# Patient Record
Sex: MALE | Race: WHITE | NOT HISPANIC OR LATINO | URBAN - METROPOLITAN AREA
[De-identification: names, ages, dates, MRNs, and addresses within clinical notes are randomized per-mention and may not be internally consistent; named-entity substitution may affect disease eponyms.]

---
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
## 2022-03-03 ENCOUNTER — OFFICE (OUTPATIENT)
Dept: URBAN - METROPOLITAN AREA CLINIC 72 | Facility: CLINIC | Age: 22
End: 2022-03-03

## 2022-03-03 VITALS
OXYGEN SATURATION: 99 % | HEART RATE: 68 BPM | HEIGHT: 73 IN | SYSTOLIC BLOOD PRESSURE: 116 MMHG | WEIGHT: 189 LBS | DIASTOLIC BLOOD PRESSURE: 64 MMHG

## 2022-03-03 DIAGNOSIS — Z79.899 OTHER LONG TERM (CURRENT) DRUG THERAPY: ICD-10-CM

## 2022-03-03 DIAGNOSIS — E55.9 VITAMIN D DEFICIENCY, UNSPECIFIED: ICD-10-CM

## 2022-03-03 DIAGNOSIS — K50.811 CROHN'S DISEASE OF BOTH SMALL AND LARGE INTESTINE WITH RECTA: ICD-10-CM

## 2022-03-03 DIAGNOSIS — R63.4 ABNORMAL WEIGHT LOSS: ICD-10-CM

## 2022-03-03 PROCEDURE — 99214 OFFICE O/P EST MOD 30 MIN: CPT | Performed by: INTERNAL MEDICINE

## 2022-03-03 RX ORDER — BUDESONIDE 3 MG/1
CAPSULE ORAL
Qty: 132 | Refills: 0 | Status: COMPLETED
Start: 2022-03-03 | End: 2022-06-21

## 2022-03-03 NOTE — SERVICENOTES
Our goal is to partner with you to improve your health and well being. It is important for you to complete necessary testing and follow the instructions given to you at your clinic visit. Our office will call you within 2 weeks with results of any testing but you may also call sooner to obtain results [phone].   If you have any questions or concerns please feel free to call.  We take your care very seriously and we thank you for your trust!
- I would like to start entyvio at our [facility].  You ashley need it at week 0,2,6 and then every 8 weeks
- I would like you to get stool test for inflammation (fecal calprotectin) as a baseline.  Please submit this prior to starting budesonide.
- take 3 pills a day (9 mg) for 1 month, then 2 pills a day (6 mg) for 2 weeks then 1 pill a day (3 mg) for 2 weeks then stop. If you have any side effects then please let me know. 
- please make an appointment to see me after your 3rd dose (after you complete 6 week induction) which should be in about 2-3 months
- please let me know if symtpoms are getting worse or you have any other new or concerning symtpoms.

## 2022-03-03 NOTE — SERVICEHPINOTES
Ramy Irizarry   is seen today for a follow-up visit.  
br
christopher
Andree has a history of crohn's disease in the small bowel and colon. Andree just moved here. He just moved in to his dorm yesterday at [facility]. He is from [place]. He is studying [employer].He is planning this summer to go abroad for 3 months.Diagnosed at age 13 and since then has been on stellara, imuran, humira and now remicade.He started humira because he started having symptoms again and he hated the injections.He started remicade around 2018. He feels like remicade works OK> if he gets stressed he will has symptoms. If he gets stressed he will have diarrhea, loss of appetite, weight loss.In the last 1-2 months he has been feeling pretty good. He has about 3 BM a day, without pain. He has been noticing some blood in the stool but it comes and goes. The blood is mixed in with the stool. He has some cramping and abdoinal pain if he eats bad or is stressed. No joint pain or rashes.Interval History:12/2/2021br8/2021 labs c/w ginny, vitamin D deficiency (mild)br8/2021 Campyolobacter positive and FC 120brI let him know we could treat him with azithromycin but he did not want treatment at this time. Pasha recommended he start vitamin D 1000 iu a day?br11/2021 noted glucose to be very low (43) and liver tests are abnormal (raised slightly in october and now more)andree has gained weightbrHe is loving [place] [employer] and school. brCrohn's symptoms are doing OK but during times of stress. brWhen it acts up it is diarrhea and cramping. No blood in the stool. andree can have some sever cramps but it goes away after BM. He is doing well on Remicade every 5 weeks. He is not sure if he is going abroad this summer or next summer. He denies any hypoglycemia symptoms. No sweating, no shakes, no light headed feeling ect. Overall doing well.    br  Plan from last visit:
br
br- start daily a daily multivitaminbr- start vitamin D 1000 iu a daybr- repeat the stool for inflammationbr- continue remicade for nowbr- if you are going to go out of the country for the summer, please let us knowbr-, start hyoscyamine/levsin 1 pill sublingual up to every 8 hours as needed for abdominal cramping or pain. let us know if that is working or notbr- follow up in 6 months or sooner if neededbr- eat frequent snacks with fiber and protein to keep glucose up. Interval History:  3/3/2022   
br  He feels like his symptoms are worse.  
br He has stomach pain and diarrhea.  
br He doesn't feel like it gets better after the infusion. 
br He generally has a BM 2-4 times a day .  He is not waking up in the middle of the night to have a BM. 
brHe is taking MVI, b12, iron. 
br He never felt like he got into a good remission with remicade.  My nurse has reviewed and updated the medication list with the patient (medication reconciliation). I have also reviewed the medication list. New updates were made to the patient's medical, social and family history. Pertinent details are also noted above in the HPI.br visited="true"

## 2022-06-21 ENCOUNTER — OFFICE (OUTPATIENT)
Dept: URBAN - METROPOLITAN AREA CLINIC 84 | Facility: CLINIC | Age: 22
End: 2022-06-21

## 2022-06-21 VITALS — HEIGHT: 73 IN | BODY MASS INDEX: 25.98 KG/M2 | WEIGHT: 196 LBS

## 2022-06-21 DIAGNOSIS — E80.4 GILBERT SYNDROME: ICD-10-CM

## 2022-06-21 DIAGNOSIS — K50.811 CROHN'S DISEASE OF BOTH SMALL AND LARGE INTESTINE WITH RECTA: ICD-10-CM

## 2022-06-21 PROCEDURE — 99214 OFFICE O/P EST MOD 30 MIN: CPT | Performed by: NURSE PRACTITIONER

## 2022-06-21 RX ORDER — BUDESONIDE 3 MG/1
CAPSULE ORAL
Qty: 132 | Refills: 0 | Status: COMPLETED
Start: 2022-06-21 | End: 2022-08-18

## 2022-06-21 NOTE — SERVICENOTES
Location of patient: At work, but in the break room
Location of provider: [place] location
Other persons participating: None, pt is alone

## 2022-06-21 NOTE — SERVICEHPINOTES
Ramy Irizarry is seen today for a follow-up visit. Andree has a history of crohn's disease in the small bowel and colon.Andree just moved here. He just moved in to his dorm yesterday at [facility]. He is from [place]. He is studying [employer].Andree is planning this summer to go abroad for 3 months.christopherDiagnosed at age 13 and since then has been on stellara, imuran, humira and now remicade.Andree started humira because he started having symptoms again and he hated the injections.Andree started remicade around 2018. He feels like remicade works OK> if he gets stressed he will has symptoms. If he gets stressed he will have diarrhea, loss of appetite, weight loss.brIn the last 1-2 months he has been feeling pretty good. He has about 3 BM a day, without pain. He has been noticing some blood in the stool but it comes and goes. The blood is mixed in with the stool. He has some cramping and abdoinal pain if he eats bad or is stressed. No joint pain or rashes.Interval History:12/2/2021br8/2021 labs c/w ginny, vitamin D deficiency (mild)br8/2021 Campyolobacter positive and FC 120brI let him know we could treat him with azithromycin but he did not want treatment at this time.Pasha recommended he start vitamin D 1000 iu a day?br11/2021 noted glucose to be very low (43) and liver tests are abnormal (raised slightly in october and now more)andree has gained weightbrKiran is loving [employer] and school.brCrohn's symptoms are doing OK but during times of stress.brWhen it acts up it is diarrhea and cramping. No blood in the stool.andree can have some sever cramps but it goes away after BM.Andree is doing well on Remicade every 5 weeks.Andree is not sure if he is going abroad this summer or next summer.Andree denies any hypoglycemia symptoms. No sweating, no shakes, no light headed feeling ect. Overall doing well.brPlan from last visit:- start daily a daily multivitaminbr- start vitamin D 1000 iu a daybr- repeat the stool for inflammationbr- continue remicade for nowbr- if you are going to go out of the country for the summer, please let us knowbr-, start hyoscyamine/levsin 1 pill sublingual up to every 8 hours as needed for abdominal cramping or pain. let us know if that is working or notbr- follow up in 6 months or sooner if neededbr- eat frequent snacks with fiber and protein to keep glucose up.Interval History:3/3/2022bChivo feels like his symptoms are worse. Andree has stomach pain and diarrhea. Andree doesn't feel like it gets better after the infusion. Andree generally has a BM 2-4 times a day . He is not waking up in the middle of the night to have a BM. Andree is taking MVI, b12, iron. Andree never felt like he got into a good remission with remicade.
br Plan
br- I would like to start entyvio at our [facility] center. You ashley need it at week 0,2,6 and then every 8 weeksbr- I would like you to get stool test for inflammation (fecal calprotectin) as a baseline. Please submit this prior to starting budesonide.br- take 3 pills a day (9 mg) for 1 month, then 2 pills a day (6 mg) for 2 weeks then 1 pill a day (3 mg) for 2 weeks then stop. If you have any side effects then please let me know. br- please make an appointment to see me after your 3rd dose (after you complete 6 week induction) which should be in about 2-3 monthsbr- please let me know if symtpoms are getting worse or you have any other new or concerning symtpoms.
br
br
br Interval history, 6/21/2022
br he did not start the budesonide.  His insurance denied Entyvio.  He did not qualify for patient assistance for Entyvio.  His last Remicade dose was about 5 months ago.  His symptoms worsened about 3 weeks ago.  He states this is the worst it's been in a long time.  He is having 5-6 bowel movements a day that are not nocturnal.  He is medium amount of blood and mucus about one bowel movement a day.  He does not have rectal pain.  He does have lower abdominal cramping with a bowel movement.  He denies weight loss, nausea vomiting.  He reports some pain in the groin on the right side makes it difficult to sleep and pain in his right ankle.  He denies rashbrStudies:brLabs:br1/22 CMP normal, glucose 109, CBC normal br11/21 K 5.3, glucose 41, ast 48, alt 69, t bili 1.7, alk phos 100, ggt 19br10/2021 t bili 2.2, ast 22, alt 37, glucose 72, cbc normal, esr 3, normal crpbr8/2021 Campylobacter positive, FC 120br8/2021 normal cbc, glucose 62, bilirubin 2.9, other LFT normal, direct bilirubin normal and indirect 2.75, Q gold neg, folate normal, Hep B Ab neg, vit D 24, esr/crp normal, b12 821, ferritin 99br7/2021 creat 0.83, t bili 1.5, ast 24, ALT 21, alk phos 94imaging:br3/21 MRI abd/pelvis with small perianal fistula, no abscess. mild idlation of second portion of the dudenum, in creased enhancement of the TI, increased enhanement of the sigmoid.procedures:brColonoscopy:br4/2021 colonoscopy fissure on perianal exam. NOrmla colon. mildly erythematous and friable distal ileum biopsies.br9/2016 colonoscopy apthous ulcrs in the ileum, normal cecum and ac,apthous ulcers in the transverse colon. Apthous ulcers in the left colon, large ulcers in the rectum. Total CD score 16.brSurgeries:brEGD:4/2021 EGD normal.br9/2016 extopic pancreatic tissues at the pylorus ow normal EGDbrother: visited="true"

## 2022-08-18 ENCOUNTER — OFFICE (OUTPATIENT)
Dept: URBAN - METROPOLITAN AREA CLINIC 72 | Facility: CLINIC | Age: 22
End: 2022-08-18

## 2022-08-18 ENCOUNTER — OFFICE (OUTPATIENT)
Dept: URBAN - METROPOLITAN AREA CLINIC 67 | Facility: CLINIC | Age: 22
End: 2022-08-18
Payer: COMMERCIAL

## 2022-08-18 VITALS
SYSTOLIC BLOOD PRESSURE: 117 MMHG | DIASTOLIC BLOOD PRESSURE: 80 MMHG | HEART RATE: 64 BPM | TEMPERATURE: 98.4 F | SYSTOLIC BLOOD PRESSURE: 111 MMHG | HEIGHT: 73 IN | HEART RATE: 64 BPM | HEART RATE: 64 BPM | HEART RATE: 81 BPM | SYSTOLIC BLOOD PRESSURE: 121 MMHG | DIASTOLIC BLOOD PRESSURE: 80 MMHG | HEIGHT: 73 IN | WEIGHT: 189 LBS | DIASTOLIC BLOOD PRESSURE: 76 MMHG | DIASTOLIC BLOOD PRESSURE: 76 MMHG | WEIGHT: 189 LBS | SYSTOLIC BLOOD PRESSURE: 111 MMHG | HEART RATE: 81 BPM | OXYGEN SATURATION: 94 % | WEIGHT: 189 LBS | RESPIRATION RATE: 14 BRPM | DIASTOLIC BLOOD PRESSURE: 76 MMHG | TEMPERATURE: 98.4 F | HEART RATE: 81 BPM | RESPIRATION RATE: 14 BRPM | DIASTOLIC BLOOD PRESSURE: 80 MMHG | SYSTOLIC BLOOD PRESSURE: 117 MMHG | DIASTOLIC BLOOD PRESSURE: 73 MMHG | RESPIRATION RATE: 14 BRPM | DIASTOLIC BLOOD PRESSURE: 73 MMHG | SYSTOLIC BLOOD PRESSURE: 121 MMHG | OXYGEN SATURATION: 94 % | HEART RATE: 84 BPM | TEMPERATURE: 98.4 F | OXYGEN SATURATION: 97 % | DIASTOLIC BLOOD PRESSURE: 73 MMHG | SYSTOLIC BLOOD PRESSURE: 117 MMHG | SYSTOLIC BLOOD PRESSURE: 121 MMHG | OXYGEN SATURATION: 97 % | HEART RATE: 84 BPM | HEIGHT: 73 IN | SYSTOLIC BLOOD PRESSURE: 111 MMHG | OXYGEN SATURATION: 94 % | OXYGEN SATURATION: 97 % | HEART RATE: 84 BPM

## 2022-08-18 VITALS
OXYGEN SATURATION: 99 % | BODY MASS INDEX: 25.45 KG/M2 | WEIGHT: 192 LBS | SYSTOLIC BLOOD PRESSURE: 120 MMHG | HEART RATE: 78 BPM | HEIGHT: 73 IN | DIASTOLIC BLOOD PRESSURE: 80 MMHG

## 2022-08-18 DIAGNOSIS — R19.7 DIARRHEA, UNSPECIFIED: ICD-10-CM

## 2022-08-18 DIAGNOSIS — K50.90 CROHN'S DISEASE, UNSPECIFIED, WITHOUT COMPLICATIONS: ICD-10-CM

## 2022-08-18 DIAGNOSIS — K92.1 MELENA: ICD-10-CM

## 2022-08-18 DIAGNOSIS — E80.4 GILBERT SYNDROME: ICD-10-CM

## 2022-08-18 DIAGNOSIS — K50.811 CROHN'S DISEASE OF BOTH SMALL AND LARGE INTESTINE WITH RECTA: ICD-10-CM

## 2022-08-18 PROCEDURE — 96413 CHEMO IV INFUSION 1 HR: CPT

## 2022-08-18 PROCEDURE — 99214 OFFICE O/P EST MOD 30 MIN: CPT | Performed by: NURSE PRACTITIONER

## 2022-08-18 NOTE — SERVICEHPINOTES
See follow-up visit from:   8/18/2022   
br   Date &amp Results of last TB test:   7/29/2022     Negative   
br   Labs and/or Additional Orders:  CMPbr Insurance authorization:  SAMPLED THROUGH [employer] PROGRAMbr Pharmacy:   [facility]   
br   Rate of Infusion:  1 hour  brInfusion order placed on:  8/15/22   
br 
Time out performed with:  Eileen Rodriguez RN

## 2022-08-18 NOTE — SERVICEHPINOTES
See follow-up visit from:   8/18/2022   
br   Date &amp Results of last TB test:   7/29/2022     Negative   
br   Labs and/or Additional Orders:  CMPbr Insurance authorization:  SAMPLED THROUGH [employer] PROGRAMbr Pharmacy:   [facility]   
br   Rate of Infusion:  1 hour  brInfusion order placed on:  8/15/22   
br 
Time out performed with:  Eileen Rodriguez RN

## 2022-08-18 NOTE — SERVICEHPINOTES
See follow-up visit from:   8/18/2022   
br   Date &amp Results of last TB test:   7/29/2022     Negative   
br   Labs and/or Additional Orders:  CMPbr Insurance authorization:  SAMPLED THROUGH [employer] PROGRAMbr Pharmacy:   [facility]   
br   Rate of Infusion:  1 hour  brInfusion order placed on:  8/15/22   
br 
Time out performed with:  Eileen Rodriguez RN

## 2022-09-19 ENCOUNTER — OFFICE (OUTPATIENT)
Dept: URBAN - METROPOLITAN AREA CLINIC 67 | Facility: CLINIC | Age: 22
End: 2022-09-19
Payer: COMMERCIAL

## 2022-09-19 VITALS
SYSTOLIC BLOOD PRESSURE: 128 MMHG | TEMPERATURE: 97.7 F | OXYGEN SATURATION: 98 % | HEIGHT: 73 IN | TEMPERATURE: 97.9 F | DIASTOLIC BLOOD PRESSURE: 68 MMHG | RESPIRATION RATE: 14 BRPM | DIASTOLIC BLOOD PRESSURE: 87 MMHG | HEART RATE: 66 BPM | HEART RATE: 59 BPM | WEIGHT: 201 LBS | DIASTOLIC BLOOD PRESSURE: 83 MMHG | OXYGEN SATURATION: 99 % | SYSTOLIC BLOOD PRESSURE: 112 MMHG | HEART RATE: 69 BPM

## 2022-09-19 DIAGNOSIS — Z23 ENCOUNTER FOR IMMUNIZATION: ICD-10-CM

## 2022-09-19 DIAGNOSIS — K50.90 CROHN'S DISEASE, UNSPECIFIED, WITHOUT COMPLICATIONS: ICD-10-CM

## 2022-09-19 PROCEDURE — 90471 IMMUNIZATION ADMIN: CPT | Performed by: NURSE PRACTITIONER

## 2022-09-19 PROCEDURE — 96413 CHEMO IV INFUSION 1 HR: CPT | Performed by: NURSE PRACTITIONER

## 2022-09-19 NOTE — SERVICENOTES
Patient observed for 10 minutes post infusion.  Patient denies chest pain, shortness of breath, or dizziness.  Handout given and reviewed with patient.  Instructed on common side effects.  Patient has no questions.

## 2022-09-19 NOTE — SERVICEHPINOTES
See follow-up visit from:   8/18/2022   
br   Date &amp Results of last TB test:   7/29/2022     Negative   
br   Labs and/or Additional Orders: CBC/CMPbr Insurance authorization: no PA sampled through [facility]br Pharmacy:   [facility]   
br   Rate of Infusion:  1 hour  brInfusion order placed on:  8/15/2022   
br 
Time out performed with:  George Martinez
br

## 2022-09-23 ENCOUNTER — OFFICE (OUTPATIENT)
Dept: URBAN - METROPOLITAN AREA CLINIC 72 | Facility: CLINIC | Age: 22
End: 2022-09-23

## 2022-09-23 VITALS
SYSTOLIC BLOOD PRESSURE: 138 MMHG | HEART RATE: 98 BPM | WEIGHT: 200 LBS | HEIGHT: 73 IN | DIASTOLIC BLOOD PRESSURE: 82 MMHG | OXYGEN SATURATION: 97 % | BODY MASS INDEX: 26.51 KG/M2

## 2022-09-23 DIAGNOSIS — K50.811 CROHN'S DISEASE OF BOTH SMALL AND LARGE INTESTINE WITH RECTA: ICD-10-CM

## 2022-09-23 DIAGNOSIS — M19.90 UNSPECIFIED OSTEOARTHRITIS, UNSPECIFIED SITE: ICD-10-CM

## 2022-09-23 DIAGNOSIS — M25.50 PAIN IN UNSPECIFIED JOINT: ICD-10-CM

## 2022-09-23 DIAGNOSIS — M79.89 OTHER SPECIFIED SOFT TISSUE DISORDERS: ICD-10-CM

## 2022-09-23 PROCEDURE — 99214 OFFICE O/P EST MOD 30 MIN: CPT | Performed by: INTERNAL MEDICINE

## 2022-09-23 RX ORDER — PREDNISONE 10 MG/1
TABLET ORAL
Qty: 60 | Refills: 0 | Status: ACTIVE
Start: 2022-09-23

## 2022-09-23 RX ORDER — PREDNISONE 5 MG/1
TABLET, DELAYED RELEASE ORAL
Qty: 60 | Refills: 0 | Status: ACTIVE
Start: 2022-09-23

## 2022-09-23 NOTE — SERVICENOTES
Our goal is to partner with you to improve your health and well being. It is important for you to complete necessary testing and follow the instructions given to you at your clinic visit. Our office will call you within 2 weeks with results of any testing but you may also call sooner to obtain results [phone].   If you have any questions or concerns please feel free to call.  We take your care very seriously and we thank you for your trust!
- take vitamin D, fish oil and curcumin.  Magnesium may also help your leg pain but avoid magnesium oxide or magnesium citrate.  Try to get slow mag or magnesium gluconate.  
- US of right leg to look for blood clot
- please turn in stool studies that tyson ordered in August which helps us get a basliine to follow with treatment
- continue prednisone 10 mg till your next infusion then drop to 5 mg for 1-2 weeks and then 2.5 mg for 1-2 weeks then stop

## 2022-09-23 NOTE — SERVICEHPINOTES
Ramy Irizarry   is seen today for a follow-up visit.  
christopher Phillips has a history of crohn's disease in the small bowel and colon.Andree just moved here. He just moved in to his dorm yesterday at [employer]. He is from [place]. He is studying [employer].Andree is planning this summer to go abroad for 3 months.christopherDiagnosed at age 13 and since then has been on stellara, imuran, humira and now remicade.Andree started humira because he started having symptoms again and he hated the injections.Andree started remicade around 2018. He feels like remicade works OK> if he gets stressed he will has symptoms. If he gets stressed he will have diarrhea, loss of appetite, weight loss.brIn the last 1-2 months he has been feeling pretty good. He has about 3 BM a day, without pain. He has been noticing some blood in the stool but it comes and goes. The blood is mixed in with the stool. He has some cramping and abdoinal pain if he eats bad or is stressed. No joint pain or rashes.Interval History:12/2/2021br8/2021 labs c/w ginny, vitamin D deficiency (mild)br8/2021 Campyolobacter positive and FC 120brI let him know we could treat him with azithromycin but he did not want treatment at this time.Pasha recommended he start vitamin D 1000 iu a day?br11/2021 noted glucose to be very low (43) and liver tests are abnormal (raised slightly in october and now more)andree has gained weightbrHe is loving [place] and school.brCrohn's symptoms are doing OK but during times of stress.brWhen it acts up it is diarrhea and cramping. No blood in the stool.andree can have some sever cramps but it goes away after BM.Andree is doing well on Remicade every 5 weeks.Andree is not sure if he is going abroad this summer or next summer.Andree denies any hypoglycemia symptoms. No sweating, no shakes, no light headed feeling ect. Overall doing well.brInterval History:3/3/2022bChivo feels like his symptoms are worse.Andree has stomach pain and diarrhea.Andree doesn't feel like it gets better after the infusion.Andree generally has a BM 2-4 times a day. He is not waking up in the middle of the night to have a BM.Andree is taking MVI, b12, iron.Andree never felt like he got into a good remission with remicade.brInterval history, 6/21/2022brhfidencio did not start the budesonide. His insurance denied Entyvio. He did not qualify for patient assistance for Entyvio. His last Remicade dose was about 5 months ago. His symptoms worsened about 3 weeks ago. He states this is the worst it's been in a long time. He is having 5-6 bowel movements a day that are not nocturnal. He is medium amount of blood and mucus about one bowel movement a day. He does not have rectal pain. He does have lower abdominal cramping with a bowel movement. He denies weight loss, nausea vomiting. He reports some pain in the groin on the right side makes it difficult to sleep and pain in his right ankle. He denies rashthe lastbrInterval history, 8/18/2022brhfidencio did not return his stool study. Budesonide did not help. He was placed on prednisone 40 mg 7/7/2022. He developed worsening arthralgias and right foot, knee, groin and back pain. Normal with the rheumatologist who thought it was related to his Crohn's. He gisela labs which showed elevated inflammatory markers but negative TB, hepatitis B, CMP. His WBC was slightly elevated. gave him a shot of Kenalog.brSince he could not get approved for into the area, we decided to treat with Skyrizi. His insurance denied it. He was approved for compassionate assistance through the drug company. He is receiving his first infusion today.Andree states he's having up to 6 bowel movements a day with occasional nocturnal stool. He sees blood with 2-3 bowel movements a day. He continues to have lower abdominal pain moderate cramping. He is currently on prednisone 30 mg daily. He is going back to college at [employer] tomorrow.    br  Plan from last visit:
br
brStart Skyrizi 600 mg IV today week 0 repeat week 4 and week 8. He will switch to 360 mg subcutaneous at week 12 and then every 8 weeks. He's been advised of the potential side effects including risk of infection, TB reactivation, hepatotoxicity's. He's been advised to avoid live vaccines. He's been advised also of other potential side effects including upper respiratory infection headache, arthralgias, abdominal pain anemia back pain and UTI. Monitor LFTs. I will review with Dr. Miramontes regarding the prednisone taper. He will return stool to assess for infection and calpro. Interval History:   
br   He did not return stool testsbr Skyrizi started and he got a dose on 8/18 and 9/19
br needs another infusion week 8, start injections at week 12 then every 8 week sub Q injections
br labs after one month look great, normal LFT
br
br HE is on 10 mg of prednisone , he feels like he still needs the prednisone.  
brIf he goes off it then his arthritis really starts acting up.  He has a lot of right leg pain and back pain.  
br He has some sweliing in his right knee.  
br His abdominal pain and symptoms are better.  He is still having diarrhea.  He has a BM 3-5 times a day.  Occasional blood in the stool.  He has cramping and pain.  
br
br He has been on prednisone since JuneMy nurse has reviewed and updated the medication list with the patient (medication reconciliation). I have also reviewed the medication list. New updates were made to the patient's medical, social and family history. Pertinent details are also noted above in the HPI.br visited="true"

## 2022-10-26 ENCOUNTER — OFFICE (OUTPATIENT)
Dept: URBAN - METROPOLITAN AREA CLINIC 67 | Facility: CLINIC | Age: 22
End: 2022-10-26

## 2022-10-26 VITALS
DIASTOLIC BLOOD PRESSURE: 75 MMHG | TEMPERATURE: 97.8 F | SYSTOLIC BLOOD PRESSURE: 111 MMHG | DIASTOLIC BLOOD PRESSURE: 74 MMHG | TEMPERATURE: 98.5 F | OXYGEN SATURATION: 97 % | DIASTOLIC BLOOD PRESSURE: 77 MMHG | SYSTOLIC BLOOD PRESSURE: 129 MMHG | WEIGHT: 205 LBS | SYSTOLIC BLOOD PRESSURE: 117 MMHG | HEART RATE: 64 BPM | HEIGHT: 73 IN | HEART RATE: 65 BPM | OXYGEN SATURATION: 99 % | HEART RATE: 71 BPM | RESPIRATION RATE: 14 BRPM

## 2022-10-26 DIAGNOSIS — K50.90 CROHN'S DISEASE, UNSPECIFIED, WITHOUT COMPLICATIONS: ICD-10-CM

## 2022-10-26 PROCEDURE — 96413 CHEMO IV INFUSION 1 HR: CPT | Performed by: NURSE PRACTITIONER

## 2022-10-26 RX ADMIN — RISANKIZUMAB-RZAA 600 MG: 60 INJECTION INTRAVENOUS at 15:10

## 2022-10-26 NOTE — SERVICEHPINOTES
See follow-up visit from:   9/23/2022   
br   Date &amp Results of last TB test:   7/29/2022     Negative   
br   Labs and/or Additional Orders: CMP/CBCbr Insurance authorization: no PA sampled through [facility] Pharm[facility]:   [facility]   
br   Rate of Infusion:  1 hour  brInfusion order placed on:  8/15/2022   
br 
Time out performed with:  George Gomez
br

## 2023-10-12 ENCOUNTER — OFFICE (OUTPATIENT)
Dept: URBAN - METROPOLITAN AREA CLINIC 72 | Facility: CLINIC | Age: 23
End: 2023-10-12

## 2023-10-12 VITALS
HEART RATE: 84 BPM | SYSTOLIC BLOOD PRESSURE: 120 MMHG | OXYGEN SATURATION: 96 % | DIASTOLIC BLOOD PRESSURE: 60 MMHG | BODY MASS INDEX: 25.84 KG/M2 | WEIGHT: 195 LBS | HEIGHT: 73 IN

## 2023-10-12 DIAGNOSIS — K50.811 CROHN'S DISEASE OF BOTH SMALL AND LARGE INTESTINE WITH RECTA: ICD-10-CM

## 2023-10-12 DIAGNOSIS — E55.9 VITAMIN D DEFICIENCY, UNSPECIFIED: ICD-10-CM

## 2023-10-12 DIAGNOSIS — E80.4 GILBERT SYNDROME: ICD-10-CM

## 2023-10-12 PROCEDURE — 99214 OFFICE O/P EST MOD 30 MIN: CPT | Performed by: INTERNAL MEDICINE

## 2023-10-12 NOTE — SERVICENOTES
Our goal is to partner with you to improve your health and well being. It is important for you to complete necessary testing and follow the instructions given to you at your clinic visit. Our office will call you within 2 weeks with results of any testing but you may also call sooner to obtain results [phone].   If you have any questions or concerns please feel free to call.  We take your care very seriously and we thank you for your trust!
- Make sure your multivitamin has at least 1000 iu of vitamin D3. 
- labs today
- follow up in 6 months

## 2023-10-12 NOTE — SERVICEHPINOTES
Ramy Irizarry   is seen today for a follow-up visit.  
christopher Irizarry is seen today for a follow-up visit. HE has a history of crohn's disease in the small bowel and colon.Andree just moved here. He just moved in to his dorm yesterday at [employer]. He is from [place]. He is studying [employer].Andree is planning this summer to go abroad for 3 months.brDiagnosed at age 13 and since then has been on stellara, imuran, humira and now remicade.Andree started humira because he started having symptoms again and he hated the injections.Andree started remicade around 2018. He feels like remicade works OK> if he gets stressed he will has symptoms. If he gets stressed he will have diarrhea, loss of appetite, weight loss.brIn the last 1-2 months he has been feeling pretty good. He has about 3 BM a day, without pain. He has been noticing some blood in the stool but it comes and goes. The blood is mixed in with the stool. He has some cramping and abdoinal pain if he eats bad or is stressed. No joint pain or rashes.Interval History:12/2/2021br8/2021 labs c/w ginny, vitamin D deficiency (mild)br8/2021 Campyolobacter positive and FC 120brI let him know we could treat him with azithromycin but he did not want treatment at this time.Pasha recommended he start vitamin D 1000 iu a day?br11/2021 noted glucose to be very low (43) and liver tests are abnormal (raised slightly in october and now more)andree has gained weightbrHe is loving "[employer] and school.brCrohn's symptoms are doing OK but during times of stress.brWhen it acts up it is diarrhea and cramping. No blood in the stool.andree can have some sever cramps but it goes away after BM.Andree is doing well on Remicade every 5 weeks.Andree is not sure if he is going abroad this summer or next summer.Andree denies any hypoglycemia symptoms. No sweating, no shakes, no light headed feeling ect. Overall doing well.brInterval History:3/3/2022brHfidencio feels like his symptoms are worse.Andree has stomach pain and diarrhea.Andree doesn't feel like it gets better after the infusion.Andree generally has a BM 2-4 times a day. He is not waking up in the middle of the night to have a BM.Andree is taking MVI, b12, iron.Andree never felt like he got into a good remission with remicade.brInterval history, 6/21/2022brhfidencio did not start the budesonide. His insurance denied Entyvio. He did not qualify for patient assistance for Entyvio. His last Remicade dose was about 5 months ago. His symptoms worsened about 3 weeks ago. He states this is the worst it's been in a long time. He is having 5-6 bowel movements a day that are not nocturnal. He is medium amount of blood and mucus about one bowel movement a day. He does not have rectal pain. He does have lower abdominal cramping with a bowel movement. He denies weight loss, nausea vomiting. He reports some pain in the groin on the right side makes it difficult to sleep and pain in his right ankle. He denies rashthe lastbrInterval history, 8/18/2022brhfidencio did not return his stool study. Budesonide did not help. He was placed on prednisone 40 mg 7/7/2022. He developed worsening arthralgias and right foot, knee, groin and back pain. Normal with the rheumatologist who thought it was related to his Crohn's. He gisela labs which showed elevated inflammatory markers but negative TB, hepatitis B, CMP. His WBC was slightly elevated. gave him a shot of Kenalog.brSince he could not get approved for into the area, we decided to treat with Skyrizi. His insurance denied it. He was approved for compassionate assistance through the drug company. He is receiving his first infusion today.Andree states he's having up to 6 bowel movements a day with occasional nocturnal stool. He sees blood with 2-3 bowel movements a day. He continues to have lower abdominal pain moderate cramping. He is currently on prednisone 30 mg daily. He is going back to college at [employer] tomorrow.Andree did not return stool testsbrSkyrizi started and he got a dose on 8/18 and 9/19brneeds another infusion week 8, start injections at week 12 then every 8 week sub Q injectionsbrlabs after one month look great, normal LFTHE is on 10 mg of prednisone, he feels like he still needs the prednisone. brIf he goes off it then his arthritis really starts acting up. He has a lot of right leg pain and back pain. brHe has some sweliing in his right knee. brHis abdominal pain and symptoms are better. He is still having diarrhea. He has a BM 3-5 times a day. Occasional blood in the stool. He has cramping and pain. He has been on prednisone since June    br  Plan from last visit:
br
br- take vitamin D, fish oil and curcumin. Magnesium may also help your leg pain but avoid magnesium oxide or magnesium citrate. Try to get slow mag or magnesium gluconate. br- US of right leg to look for blood clotbr- please turn in stool studies that tyson ordered in August which helps us get a basliine to follow with treatmentbr- continue prednisone 10 mg till your next infusion then drop to 5 mg for 1-2 weeks and then 2.5 mg for 1-2 weeks then stop Interval History:  10/12/2023  
br
christopher Mclean is working well for him. 
br He is doing injections every weeks. 
br
br BM are normal. 1-3 times a day.  Rare blood in stool last was 3 months ago and very small amount. . Occasional pain with price BM coming out. 
br
br No fevers.   He does sweat in his sleep. 
br
br No rashes.  Rare body aches in knees and hips.  once a week.  
br He is taking a multivitamin daily.  br
br  My nurse has reviewed and updated the medication list with the patient (medication reconciliation). I have also reviewed the medication list. New updates were made to the patient's medical, social and family history. Pertinent details are also noted above in the HPI.br visited="true"